# Patient Record
Sex: MALE | URBAN - METROPOLITAN AREA
[De-identification: names, ages, dates, MRNs, and addresses within clinical notes are randomized per-mention and may not be internally consistent; named-entity substitution may affect disease eponyms.]

---

## 2024-09-05 ENCOUNTER — APPOINTMENT (RX ONLY)
Dept: URBAN - METROPOLITAN AREA CLINIC 45 | Facility: CLINIC | Age: 54
Setting detail: DERMATOLOGY
End: 2024-09-05

## 2024-09-05 DIAGNOSIS — L85.3 XEROSIS CUTIS: ICD-10-CM

## 2024-09-05 DIAGNOSIS — L91.0 HYPERTROPHIC SCAR: ICD-10-CM

## 2024-09-05 PROCEDURE — ? COUNSELING

## 2024-09-05 PROCEDURE — ? TREATMENT REGIMEN

## 2024-09-05 PROCEDURE — 99203 OFFICE O/P NEW LOW 30 MIN: CPT | Mod: 25

## 2024-09-05 PROCEDURE — 11900 INJECT SKIN LESIONS </W 7: CPT

## 2024-09-05 PROCEDURE — ? INTRALESIONAL KENALOG

## 2024-09-05 ASSESSMENT — LOCATION SIMPLE DESCRIPTION DERM
LOCATION SIMPLE: CHEST
LOCATION SIMPLE: ABDOMEN

## 2024-09-05 ASSESSMENT — LOCATION DETAILED DESCRIPTION DERM
LOCATION DETAILED: LEFT MEDIAL INFERIOR CHEST
LOCATION DETAILED: RIGHT LATERAL INFERIOR CHEST
LOCATION DETAILED: RIGHT MEDIAL INFERIOR CHEST
LOCATION DETAILED: XIPHOID
LOCATION DETAILED: STERNUM

## 2024-09-05 ASSESSMENT — LOCATION ZONE DERM: LOCATION ZONE: TRUNK

## 2024-09-05 NOTE — PROCEDURE: INTRALESIONAL KENALOG
How Many Mls Were Removed From The 40 Mg/Ml (5ml) Vial When Preparing The Injectable Solution?: 0
Validate Note Data When Using Inventory: Yes
Include Z78.9 (Other Specified Conditions Influencing Health Status) As An Associated Diagnosis?: No
Kenalog Type Of Vial: Multiple Dose
Kenalog Preparation: Kenalog
Concentration Of Kenalog Solution Injected (Mg/Ml): 2.5
Medical Necessity Clause: This procedure was medically necessary because the lesions that were treated were:
Total Volume (Ccs): 2
Detail Level: Detailed
Consent: The risks of atrophy were reviewed with the patient.

## 2024-09-05 NOTE — PROCEDURE: TREATMENT REGIMEN
Plan: Location: chest\\n\\n9/5/24\\nPhotos taken\\n\\nToday patient presents with keloid scarring of chest.\\nPt states he has had scarring since he was a teenager\\nPt states he has had 2 areas treated with plastic surgery previously\\nPt has not recently had any new keloid scarring\\n\\nPt states affected areas burn and itch often.\\n\\nDiscussed with patient that keloid scarring is a result of acne scarring\\nDiscussed with patient that keloid scarring is a collagen reaction to injury that causes a buildup of scar tissue\\nDiscussed with patient that today we will treat with intralesional 5FUK40 injection\\nDiscussed with patient that we will follow up in a few weeks to see how injection helped scarring and to proceed with further treatment  \\n\\nFollow up 6-8 weeks.
Detail Level: Zone

## 2024-10-31 ENCOUNTER — APPOINTMENT (RX ONLY)
Dept: URBAN - METROPOLITAN AREA CLINIC 45 | Facility: CLINIC | Age: 54
Setting detail: DERMATOLOGY
End: 2024-10-31

## 2024-10-31 DIAGNOSIS — L91.0 HYPERTROPHIC SCAR: ICD-10-CM

## 2024-10-31 DIAGNOSIS — L85.3 XEROSIS CUTIS: ICD-10-CM

## 2024-10-31 PROCEDURE — 11900 INJECT SKIN LESIONS </W 7: CPT

## 2024-10-31 PROCEDURE — ? INTRALESIONAL KENALOG

## 2024-10-31 PROCEDURE — ? COUNSELING

## 2024-10-31 PROCEDURE — ? TREATMENT REGIMEN

## 2024-10-31 PROCEDURE — 99213 OFFICE O/P EST LOW 20 MIN: CPT | Mod: 25

## 2024-10-31 ASSESSMENT — LOCATION DETAILED DESCRIPTION DERM
LOCATION DETAILED: STERNUM
LOCATION DETAILED: RIGHT LATERAL INFERIOR CHEST
LOCATION DETAILED: LEFT MEDIAL INFERIOR CHEST
LOCATION DETAILED: RIGHT MEDIAL INFERIOR CHEST
LOCATION DETAILED: XIPHOID

## 2024-10-31 ASSESSMENT — LOCATION SIMPLE DESCRIPTION DERM
LOCATION SIMPLE: CHEST
LOCATION SIMPLE: ABDOMEN

## 2024-10-31 ASSESSMENT — LOCATION ZONE DERM: LOCATION ZONE: TRUNK

## 2024-10-31 NOTE — PROCEDURE: TREATMENT REGIMEN
Plan: Location: chest, back \\n\\n10/31/24\\nPhotos taken\\n\\nToday patient presents with keloid scarring of chest.\\nPt states he has had scarring since he was a teenager\\nPt has not recently had any new keloid scarring\\nHe states that the spots have improved since the last injections \\n\\n\\nPt states affected areas burn and itch often.\\n\\nDiscussed with patient that keloid scarring is a result of acne scarring\\nDiscussed with patient that keloid scarring is a collagen reaction to injury that causes a buildup of scar tissue\\nDiscussed with patient that today we will treat with intralesional 5FUK40 injection\\nDiscussed with patient that we will follow up in a month to see how injection helped scarring and to proceed with further treatment  \\n\\nFollow up 1 month
Detail Level: Zone